# Patient Record
Sex: FEMALE | Race: WHITE | Employment: FULL TIME | URBAN - METROPOLITAN AREA
[De-identification: names, ages, dates, MRNs, and addresses within clinical notes are randomized per-mention and may not be internally consistent; named-entity substitution may affect disease eponyms.]

---

## 2024-07-08 ENCOUNTER — HOSPITAL ENCOUNTER (EMERGENCY)
Age: 24
Discharge: HOME | End: 2024-07-08

## 2024-07-08 ENCOUNTER — APPOINTMENT (OUTPATIENT)
Dept: GENERAL RADIOLOGY | Age: 24
End: 2024-07-08

## 2024-07-08 VITALS
WEIGHT: 125 LBS | TEMPERATURE: 98.1 F | HEIGHT: 67 IN | BODY MASS INDEX: 19.62 KG/M2 | RESPIRATION RATE: 16 BRPM | DIASTOLIC BLOOD PRESSURE: 71 MMHG | HEART RATE: 98 BPM | OXYGEN SATURATION: 98 % | SYSTOLIC BLOOD PRESSURE: 110 MMHG

## 2024-07-08 DIAGNOSIS — M25.561 ACUTE PAIN OF RIGHT KNEE: ICD-10-CM
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
## 2024-07-08 PROCEDURE — 99283 EMERGENCY DEPT VISIT LOW MDM: CPT

## 2024-07-08 PROCEDURE — 73562 X-RAY EXAM OF KNEE 3: CPT

## 2024-07-08 RX ORDER — DULOXETIN HYDROCHLORIDE 20 MG/1
20 CAPSULE, DELAYED RELEASE ORAL DAILY
COMMUNITY

## 2024-07-08 ASSESSMENT — PAIN DESCRIPTION - PAIN TYPE: TYPE: ACUTE PAIN

## 2024-07-08 ASSESSMENT — PAIN DESCRIPTION - ORIENTATION: ORIENTATION: RIGHT

## 2024-07-08 ASSESSMENT — PAIN SCALES - GENERAL: PAINLEVEL_OUTOF10: 2

## 2024-07-08 ASSESSMENT — LIFESTYLE VARIABLES
HOW OFTEN DO YOU HAVE A DRINK CONTAINING ALCOHOL: MONTHLY OR LESS
HOW MANY STANDARD DRINKS CONTAINING ALCOHOL DO YOU HAVE ON A TYPICAL DAY: 1 OR 2

## 2024-07-08 ASSESSMENT — PAIN DESCRIPTION - DESCRIPTORS: DESCRIPTORS: ACHING

## 2024-07-08 ASSESSMENT — PAIN DESCRIPTION - LOCATION: LOCATION: KNEE
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
## 2024-07-09 NOTE — DISCHARGE INSTRUCTIONS
There are no signs of broken bones on your x-rays today in the emergency department.  Treat your symptoms conservatively with Tylenol or NSAIDs as needed.  Follow-up with your primary care provider as scheduled.  Return if you experience any worrisome or worsening symptoms.
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
## 2024-07-09 NOTE — ED NOTES
Patient mobility status  with no difficulty. Provider aware     I have reviewed discharge instructions with the patient.  The patient verbalized understanding.    Patient left ED via Discharge Method: ambulatory to Home with  EMS co-worker .    Opportunity for questions and clarification provided.     Patient given 0 scripts.

## 2024-07-10 ENCOUNTER — TRANSCRIBE ORDERS (OUTPATIENT)
Dept: OCCUPATIONAL MEDICINE | Age: 24
End: 2024-07-10

## 2024-07-10 ENCOUNTER — HOSPITAL ENCOUNTER (OUTPATIENT)
Dept: GENERAL RADIOLOGY | Age: 24
Discharge: HOME | End: 2024-07-13

## 2024-07-10 DIAGNOSIS — T14.90XA INJURY: Primary | ICD-10-CM

## 2024-07-10 DIAGNOSIS — T14.90XA INJURY: ICD-10-CM

## 2024-07-10 PROCEDURE — 72100 X-RAY EXAM L-S SPINE 2/3 VWS: CPT

## 2024-07-10 PROCEDURE — 72072 X-RAY EXAM THORAC SPINE 3VWS: CPT

## 2024-09-27 ENCOUNTER — HOSPITAL ENCOUNTER (EMERGENCY)
Age: 24
Discharge: HOME | End: 2024-09-27
Attending: EMERGENCY MEDICINE

## 2024-09-27 VITALS
OXYGEN SATURATION: 98 % | HEART RATE: 95 BPM | RESPIRATION RATE: 18 BRPM | SYSTOLIC BLOOD PRESSURE: 102 MMHG | BODY MASS INDEX: 19.62 KG/M2 | TEMPERATURE: 99.3 F | HEIGHT: 67 IN | WEIGHT: 125 LBS | DIASTOLIC BLOOD PRESSURE: 63 MMHG

## 2024-09-27 DIAGNOSIS — H69.91 DYSFUNCTION OF RIGHT EUSTACHIAN TUBE: ICD-10-CM

## 2024-09-27 DIAGNOSIS — H65.91 RIGHT NON-SUPPURATIVE OTITIS MEDIA: Primary | ICD-10-CM

## 2024-09-27 DIAGNOSIS — H92.01 RIGHT EAR PAIN: ICD-10-CM

## 2024-09-27 PROCEDURE — 99283 EMERGENCY DEPT VISIT LOW MDM: CPT

## 2024-09-27 RX ORDER — FLUTICASONE PROPIONATE 50 MCG
2 SPRAY, SUSPENSION (ML) NASAL DAILY
Qty: 1 EACH | Refills: 0 | Status: SHIPPED | OUTPATIENT
Start: 2024-09-27 | End: 2024-10-07

## 2024-09-27 RX ORDER — AMOXICILLIN 500 MG/1
500 CAPSULE ORAL 3 TIMES DAILY
Qty: 21 CAPSULE | Refills: 0 | Status: SHIPPED | OUTPATIENT
Start: 2024-09-27 | End: 2024-10-04

## 2024-09-27 ASSESSMENT — PAIN - FUNCTIONAL ASSESSMENT: PAIN_FUNCTIONAL_ASSESSMENT: 0-10

## 2024-09-27 ASSESSMENT — PAIN SCALES - GENERAL: PAINLEVEL_OUTOF10: 7

## 2024-09-27 NOTE — ED PROVIDER NOTES
Emergency Department Provider Note       PCP: No primary care provider on file.   Age: 23 y.o.   Sex: female     DISPOSITION Decision To Discharge 09/27/2024 09:03:07 AM  Condition at Disposition: Data Unavailable       ICD-10-CM    1. Right non-suppurative otitis media  H65.91       2. Right ear pain  H92.01       3. Dysfunction of right eustachian tube  H69.91           Medical Decision Making     Patient has significant pain and discomfort to the right ear.  I suspect some of it may be due to increased past her from blocked eustachian tube but I also think she is developing an infection with the lymph nodes being swollen and some bulging of the TM we will place her on amoxicillin and anti-inflammatories Flonase and decongestant.       Prescription drug management performed.  Shared medical decision making was utilized in creating the patients health plan today.  I independently ordered and reviewed each unique test.    I reviewed external records: provider visit note from PCP.       History     Patient is coming in with right ear pain.  She states it started with a sore throat couple days ago.  She then started to have pain mainly in her right ear but a little bit in her left ear.  There is also some pain surrounding the ear.  No drainage.  She states she has been in tears throughout the last 24 hours due to the pain.  She tried some over-the-counter cold medicine which did not help.  She does have a history of ear infections no history of throat infections no trouble breathing and no fevers.    The history is provided by the patient.     Physical Exam     Vitals signs and nursing note reviewed:  Vitals:    09/27/24 0849   BP: 102/63   Pulse: 95   Resp: 18   Temp: 99.3 °F (37.4 °C)   TempSrc: Oral   SpO2: 98%   Weight: 56.7 kg (125 lb)   Height: 1.702 m (5' 7\")      Physical Exam  Vitals and nursing note reviewed.   Constitutional:       General: She is not in acute distress.     Appearance: She is not

## 2024-09-27 NOTE — ED NOTES
Patient mobility status  with no difficulty. Provider aware     I have reviewed discharge instructions with the patient.  The patient verbalized understanding.    Patient left ED via Discharge Method: ambulatory to Home with  family .    Opportunity for questions and clarification provided.     Patient given 4 scripts.

## 2024-12-31 ENCOUNTER — HOSPITAL ENCOUNTER (EMERGENCY)
Age: 24
Discharge: HOME | End: 2024-12-31
Attending: EMERGENCY MEDICINE
Payer: COMMERCIAL

## 2024-12-31 VITALS
WEIGHT: 123 LBS | BODY MASS INDEX: 19.3 KG/M2 | DIASTOLIC BLOOD PRESSURE: 69 MMHG | SYSTOLIC BLOOD PRESSURE: 103 MMHG | HEIGHT: 67 IN | HEART RATE: 89 BPM | TEMPERATURE: 100 F | OXYGEN SATURATION: 98 % | RESPIRATION RATE: 18 BRPM

## 2024-12-31 DIAGNOSIS — J11.1 INFLUENZA WITH RESPIRATORY MANIFESTATION OTHER THAN PNEUMONIA: Primary | ICD-10-CM

## 2024-12-31 LAB
FLUAV RNA SPEC QL NAA+PROBE: DETECTED
FLUBV RNA SPEC QL NAA+PROBE: NOT DETECTED
RSV RNA NPH QL NAA+PROBE: NOT DETECTED
SARS-COV-2 RDRP RESP QL NAA+PROBE: NOT DETECTED
SOURCE: NORMAL
SOURCE: NORMAL

## 2024-12-31 PROCEDURE — 99283 EMERGENCY DEPT VISIT LOW MDM: CPT

## 2024-12-31 PROCEDURE — 87634 RSV DNA/RNA AMP PROBE: CPT

## 2024-12-31 PROCEDURE — 87502 INFLUENZA DNA AMP PROBE: CPT

## 2024-12-31 PROCEDURE — 6370000000 HC RX 637 (ALT 250 FOR IP): Performed by: EMERGENCY MEDICINE

## 2024-12-31 PROCEDURE — 87635 SARS-COV-2 COVID-19 AMP PRB: CPT

## 2024-12-31 RX ORDER — BROMPHENIRAMINE MALEATE, PSEUDOEPHEDRINE HYDROCHLORIDE, AND DEXTROMETHORPHAN HYDROBROMIDE 2; 30; 10 MG/5ML; MG/5ML; MG/5ML
5 SYRUP ORAL 4 TIMES DAILY PRN
Qty: 80 ML | Refills: 0 | Status: SHIPPED | OUTPATIENT
Start: 2024-12-31 | End: 2025-01-05

## 2024-12-31 RX ORDER — BENZONATATE 100 MG/1
100 CAPSULE ORAL 3 TIMES DAILY PRN
Qty: 30 CAPSULE | Refills: 0 | Status: SHIPPED | OUTPATIENT
Start: 2024-12-31 | End: 2025-01-10

## 2024-12-31 RX ORDER — GUAIFENESIN/DEXTROMETHORPHAN 100-10MG/5
10 SYRUP ORAL
Status: COMPLETED | OUTPATIENT
Start: 2024-12-31 | End: 2024-12-31

## 2024-12-31 RX ORDER — BENZONATATE 100 MG/1
200 CAPSULE ORAL
Status: COMPLETED | OUTPATIENT
Start: 2024-12-31 | End: 2024-12-31

## 2024-12-31 RX ORDER — PREDNISONE 50 MG/1
50 TABLET ORAL DAILY
Qty: 4 TABLET | Refills: 0 | Status: SHIPPED | OUTPATIENT
Start: 2025-01-01 | End: 2025-01-05

## 2024-12-31 RX ORDER — PREDNISONE 50 MG/1
50 TABLET ORAL
Status: COMPLETED | OUTPATIENT
Start: 2024-12-31 | End: 2024-12-31

## 2024-12-31 RX ADMIN — BENZONATATE 200 MG: 100 CAPSULE ORAL at 08:55

## 2024-12-31 RX ADMIN — PREDNISONE 50 MG: 50 TABLET ORAL at 08:55

## 2024-12-31 RX ADMIN — GUAIFENESIN AND DEXTROMETHORPHAN 10 ML: 100; 10 SYRUP ORAL at 08:55

## 2024-12-31 ASSESSMENT — PAIN DESCRIPTION - LOCATION: LOCATION: GENERALIZED

## 2024-12-31 ASSESSMENT — PAIN DESCRIPTION - DESCRIPTORS: DESCRIPTORS: ACHING;DISCOMFORT

## 2024-12-31 ASSESSMENT — PAIN DESCRIPTION - FREQUENCY: FREQUENCY: CONTINUOUS

## 2024-12-31 ASSESSMENT — PAIN SCALES - GENERAL: PAINLEVEL_OUTOF10: 8

## 2024-12-31 ASSESSMENT — PAIN DESCRIPTION - ONSET: ONSET: PROGRESSIVE

## 2024-12-31 ASSESSMENT — PAIN DESCRIPTION - PAIN TYPE: TYPE: ACUTE PAIN

## 2024-12-31 ASSESSMENT — PAIN - FUNCTIONAL ASSESSMENT: PAIN_FUNCTIONAL_ASSESSMENT: 0-10

## 2024-12-31 NOTE — ED PROVIDER NOTES
Emergency Department Provider Note       PCP: Melinda Scruggs MD   Age: 24 y.o.   Sex: female     DISPOSITION Decision To Discharge 12/31/2024 10:05:01 AM    ICD-10-CM    1. Influenza with respiratory manifestation other than pneumonia  J11.1           Medical Decision Making     Patient is coming in with worsening diffuse symptoms consistent with viral syndrome.  Today her influenza A test was positive.  She is not due for Tamiflu would not be of any use.  Advised alternating Tylenol and ibuprofen and I will treat respiratory symptoms symptomatically.  She does feel better after receiving dose of steroid and antitussives in the ED.  Room air oxygen saturation is 98%.  She also had negative x-ray done yesterday I do not see need to reexpose her to radiation.     1 acute illness with systemic symptoms.  Prescription drug management performed.  Shared medical decision making was utilized in creating the patients health plan today.  Considerations: The following items were considered but not ordered: Chest x-ray.  I independently ordered and reviewed each unique test.    I reviewed external records: provider visit note from PCP.       History     Patient presenting with congestion cough and shortness of breath for the last 3 days.  She states it is gradually worsened.  She did go see her PCP yesterday and had COVID and flu swabs which were negative.  They suspected virus.  She states she was not given any prescriptions.  Today she is feeling worse with some dry heaves and continued cough.  She also reports that continuing congestion no diarrhea or abdominal pain.  She is having low-grade fevers.    The history is provided by the patient.     Physical Exam     Vitals signs and nursing note reviewed:  Vitals:    12/31/24 0834 12/31/24 0950   BP: 122/81    Pulse: 89    Resp: 20    Temp: 100 °F (37.8 °C)    TempSrc: Oral    SpO2: 99% 98%   Weight: 55.8 kg (123 lb)    Height: 1.702 m (5' 7\")       Physical

## 2024-12-31 NOTE — ED TRIAGE NOTES
Pt to the ED from home with mother with c/o of SOB for the past 2 days. Pt states that she was seen at PCP yesterday and was told she had a virus. Covid & flu were negative.       PT 99 on RA